# Patient Record
Sex: FEMALE | Race: WHITE | NOT HISPANIC OR LATINO | URBAN - NONMETROPOLITAN AREA
[De-identification: names, ages, dates, MRNs, and addresses within clinical notes are randomized per-mention and may not be internally consistent; named-entity substitution may affect disease eponyms.]

---

## 2019-04-15 ENCOUNTER — IMPORTED ENCOUNTER (OUTPATIENT)
Dept: URBAN - NONMETROPOLITAN AREA CLINIC 1 | Facility: CLINIC | Age: 69
End: 2019-04-15

## 2019-04-15 PROBLEM — H35.373: Noted: 2019-04-15

## 2019-04-15 PROBLEM — H43.393: Noted: 2019-04-15

## 2019-04-15 PROBLEM — H16.223: Noted: 2019-04-15

## 2019-04-15 PROBLEM — Z96.1: Noted: 2019-04-15

## 2019-04-15 PROBLEM — H26.491: Noted: 2019-04-15

## 2019-04-15 PROCEDURE — 92134 CPTRZ OPH DX IMG PST SGM RTA: CPT

## 2019-04-15 PROCEDURE — 92014 COMPRE OPH EXAM EST PT 1/>: CPT

## 2019-04-15 NOTE — PATIENT DISCUSSION
OCT MAC done today 4/15/19s/p PCIOL-Stable PCIOL s/p YAG Caps OS.-Monitor. Early PCO OD -Explained symptoms of advancing PCO. -Continue to monitor for now. Pt will notify us if any new symptoms develop. Flashes and Floaters:. OU -Discussed  signs/symptoms of RD or tear.-Discussed in detail the nature of flashes/floaters and to call if there is a sudden increase in their number. F-rmexa-Ztoacxhhs k-siccaand associated symptoms.-Recommend increasing Omega 3s.-Continue artificial tears OU to atleast QID PRN. Pt will contact us if this does not provide relief. Consider punctal plugs in that case. Epiretinal membrane:. OU -Discussed findings of exam in detail with the patient.-Discussed the signs of worsening of the disease. .-OCT MAC performed and reviewed with the patient.; 's Notes: DFE 4/15/19OCT MAC 4/15/19

## 2020-06-22 ENCOUNTER — IMPORTED ENCOUNTER (OUTPATIENT)
Dept: URBAN - NONMETROPOLITAN AREA CLINIC 1 | Facility: CLINIC | Age: 70
End: 2020-06-22

## 2020-06-22 PROBLEM — H16.223: Noted: 2019-04-15

## 2020-06-22 PROBLEM — H43.393: Noted: 2019-04-15

## 2020-06-22 PROBLEM — Z96.1: Noted: 2020-06-22

## 2020-06-22 PROBLEM — H26.491: Noted: 2020-06-22

## 2020-06-22 PROBLEM — H35.373: Noted: 2019-04-15

## 2020-06-22 PROCEDURE — 92014 COMPRE OPH EXAM EST PT 1/>: CPT

## 2020-06-22 NOTE — PATIENT DISCUSSION
s/p PCIOL-Stable PCIOL s/p YAG Caps OS.-Monitor for pco odEarly PCO OD -Explained symptoms of advancing PCO. -Continue to monitor for now. Pt will notify us if any new symptoms develop. Flashes and Floaters:. OU (old)-Discussed  signs/symptoms of RD or tear.-Discussed in detail the nature of flashes/floaters and to call if there is a sudden increase in their number. D-iudao-Alietcwxs k-siccaand associated symptoms.-Recommend increasing Omega 3s.-Continue artificial tears OU to atleast QID PRN. Pt will contact us if this does not provide relief. Consider punctal plugs in that case. Epiretinal membrane:. OU -Discussed findings of exam in detail with the patient.-Discussed the signs of worsening of the disease. .; 's Notes: DFE 4/15/19OCT MAC 4/15/19

## 2021-06-24 ENCOUNTER — IMPORTED ENCOUNTER (OUTPATIENT)
Dept: URBAN - NONMETROPOLITAN AREA CLINIC 1 | Facility: CLINIC | Age: 71
End: 2021-06-24

## 2021-06-24 PROCEDURE — 92015 DETERMINE REFRACTIVE STATE: CPT

## 2021-06-24 PROCEDURE — 92014 COMPRE OPH EXAM EST PT 1/>: CPT

## 2021-06-24 NOTE — PATIENT DISCUSSION
s/p PCIOL-Stable PCIOL s/p YAG Caps OS.-Monitor for pco odEarly PCO OD -Explained symptoms of advancing PCO. -Continue to monitor for now. Pt will notify us if any new symptoms develop. Flashes and Floaters:. OU (old)-Discussed  signs/symptoms of RD or tear.-Discussed in detail the nature of flashes/floaters and to call if there is a sudden increase in their number. C-lgzht-Kieocndhs k-siccaand associated symptoms.-Recommend increasing Omega 3s.-Continue artificial tears OU to atleast QID PRN. Pt will contact us if this does not provide relief. Consider punctal plugs in that case. Epiretinal membrane:. OU -Discussed findings of exam in detail with the patient.-Discussed the signs of worsening of the disease. .; 's Notes: DFE 4/15/19OCT MAC 4/15/19

## 2022-04-10 ASSESSMENT — TONOMETRY
OS_IOP_MMHG: 16
OD_IOP_MMHG: 17
OS_IOP_MMHG: 16
OS_IOP_MMHG: 17
OD_IOP_MMHG: 16
OD_IOP_MMHG: 16

## 2022-04-10 ASSESSMENT — VISUAL ACUITY
OD_CC: J1
OS_CC: 20/30+2
OD_CC: 20/25+
OS_CC: J1
OS_CC: 20/25
OD_CC: 20/25
OD_CC: 20/25-2
OU_CC: 20/25
OS_CC: 20/25